# Patient Record
Sex: FEMALE | Race: WHITE | NOT HISPANIC OR LATINO | URBAN - METROPOLITAN AREA
[De-identification: names, ages, dates, MRNs, and addresses within clinical notes are randomized per-mention and may not be internally consistent; named-entity substitution may affect disease eponyms.]

---

## 2018-02-22 ENCOUNTER — IMPORTED ENCOUNTER (OUTPATIENT)
Dept: URBAN - METROPOLITAN AREA CLINIC 31 | Facility: CLINIC | Age: 58
End: 2018-02-22

## 2018-02-22 PROBLEM — H25.012: Noted: 2018-02-22

## 2018-02-22 PROCEDURE — 92004 COMPRE OPH EXAM NEW PT 1/>: CPT

## 2018-02-22 PROCEDURE — 92015 DETERMINE REFRACTIVE STATE: CPT

## 2018-02-22 NOTE — PATIENT DISCUSSION
1.  Refractive error Annual Good ocular health documented. Discussed options of glasses contacts or refractive surgery. Discussed importance of annual eye exams.  2.  Cataract OS: Eval with Dr. Marin Cr

## 2022-04-02 ASSESSMENT — TONOMETRY
OD_IOP_MMHG: 14
OS_IOP_MMHG: 13

## 2022-04-02 ASSESSMENT — VISUAL ACUITY
OD_CC: J114''
OS_CC: J214''
OD_SC: 20/25+2
OS_CC: 20/40
OS_SC: 20/40
OS_GLARE: 20/50MED
OD_CC: 20/25